# Patient Record
(demographics unavailable — no encounter records)

---

## 2025-03-11 NOTE — ASSESSMENT
[FreeTextEntry1] : Patient placed into wayne tape.  Gentle range of motion of the finger.  Icing, anti-inflammatory.  Follow-up in our hand department for further evaluation and treatment.

## 2025-03-11 NOTE — HISTORY OF PRESENT ILLNESS
[de-identified] : 46-year-old male comes in today for an evaluation of his left pinky finger pain and injury that occurred last night.  Patient had a fall sustained a dislocation.  Went to [facility] had an x-ray done reduction and was placed into an AlumaFoam splint.

## 2025-03-11 NOTE — IMAGING
[de-identified] : On examination of the left fifth digit swelling, skin is intact.  Nail is intact.  He has tenderness over the PIP joint middle phalanx and proximal phalanx.  No tenderness over the DIP joint or distal phalanx.  No tenderness over the fifth metacarpal.  He is able to flex extend at the DIP and PIP joint although with restriction and pain.    X-ray reviewed from [facility] Left fifth digit dislocation at the PIP joint with associated soft tissue swelling and volar osseous fragment.  Postreduction film with Interval closed reduction of left fifth digit PIP joint dislocation. X-ray in the office today left fifth digit 3 views no dislocation, maintained reduction, volar middle phalanx fracture

## 2025-03-19 NOTE — IMAGING
[de-identified] : On examination of the lumbar spine, there is no focal tenderness ovation over the lumbar vertebrae's, nontender the midline. No muscle spasm palpated. Patient has significant decreased range of motion to lumbar spine to flexion and extension, patient is only able to forward flex to about 15 degrees and extend to about 5 degrees with and pain. Patient has mild decreased motor strength to the left lower extremity when compared to the right. Positive straight leg raise to the left, negative to the right. Antalgic gait.  Radiographs of the lumbar spine, 4 views were taken, negative for any acute fracture or dislocation, some straightening of the normal lordosis of the lumbar spine.

## 2025-03-19 NOTE — HISTORY OF PRESENT ILLNESS
[de-identified] : 46-year-old male here for an evaluation of pain to the lower back, patient states that on March 17, 2025, he bent forward at and he had sharp severe pain to his lower back, he was unable to straighten his back to neutral.  Patient states that since then he is having significant pain when walking, sitting or standing.  Patient states that the pain at rest is 7/10, pain with activity 10/10.  Patient also gives a history of a similar condition in September 2024, patient states that at that time he was lifting his daughter who is about 50 pounds and he has severe pain to the lower back, he was seen in our office, he was given medication for his pain and therapy, patient has states that the pain is slightly improved but has never been pain-free.

## 2025-03-19 NOTE — DISCUSSION/SUMMARY
[de-identified] : Impression: Lumbar sprain, rule out HNP  Plan: Patient was advised for physical therapy, a prescription for Medrol Dosepak was sent to the pharmacy and muscle relaxant, patient was referred for an MRI of the lumbar spine to rule out any herniated disc or bulging disc.  Patient unable to come into work due to severe pain to the lower back, and note to work from home was given  Follow-up: 4 weeks for repeat evaluation with Dr. Bhatti`

## 2025-03-28 NOTE — HISTORY OF PRESENT ILLNESS
[de-identified] : 46-year-old male had a left little finger PIP dislocation.  He was a lateral dislocation.  He was reduced.  He comes in today for evaluation.  He really has not been trying to bend the finger forcibly.

## 2025-03-28 NOTE — PHYSICAL EXAM
[de-identified] : Patient has grossly good alignment of the finger.  There is tenderness to palpation.  Normal capillary refill.  Is no hyperextension instability.

## 2025-03-28 NOTE — DATA REVIEWED
[FreeTextEntry1] : Radiographs in the hospital reviewed showing a lateral dislocation and then a relocated finger

## 2025-03-28 NOTE — ASSESSMENT
[FreeTextEntry1] : Patient is dislocation of his left little finger PIP joint.  Buddy tape range of motion exercises discussed with the patient.  He is also given a prescription for therapy if he does not get his motion back quickly he should go to formal therapy understands this he will see me back on an as needed basis.

## 2025-04-16 NOTE — IMAGING
[de-identified] : TTP midline spine and paraspinal musculature  Strength                                          Hip flexor   Right: 5/5; Left: 5/5                              Knee extensor     Right: 5/5; Left: 5/5                      Ankle dorsiflexion   Right: 5/5; Left: 5/5                   EHL           Right: 5/5; Left: 5/5                                 Ankle plantarflexion       Right: 5/5; Left: 5/5  Sensation L1   Right: 2/2; Left: 2/2 L2   Right: 2/2; Left: 2/2 L3   Right: 2/2; Left: 2/2 L4   Right: 2/2; Left: 2/2 L5   Right: 2/2; Left: 2/2 S1   Right: 2/2; Left: 2/2  Reflexes Patella   Right: 2+; Left 2+ Achilles   Right: 2+; Left 2+ Clonus  Right: absent; L: absent

## 2025-04-16 NOTE — HISTORY OF PRESENT ILLNESS
[de-identified] : 46-year-old male presents with low back pain.  He hurt his back again in March.  It goes into his left thigh.  He has not done any physical therapy because of this his MRI of his lumbar spine was denied by the insurance company.  He did not start therapy he did get a prescription of

## 2025-04-16 NOTE — DISCUSSION/SUMMARY
[de-identified] : 46-year-old male with lumbar injury.  I urged him to do physical therapy he will follow-up in 6 weeks if his pain and he will get an order for l MRI umbar spine

## 2025-05-23 NOTE — PHYSICAL EXAM
[de-identified] : Patient has ability extend the finger to neutral.  Has some asymmetric swelling along the radial collateral ligament level.  He has 95% of his motion back.  No instability to stress testing of the collateral ligament

## 2025-05-23 NOTE — HISTORY OF PRESENT ILLNESS
[de-identified] : 47-year-old male had a left little finger PIP dislocation.  He comes in today for evaluation.  He got it significant improvement in his motion he still having some pain and discomfort and stiffness he was concerned and he came in the office for evaluation

## 2025-05-23 NOTE — ASSESSMENT
[FreeTextEntry1] : Patient had a PIP dislocation.  The plan is continue range of motion exercises and stretching exercises I have reassured him that he should get all his motion back.  He will see me back on an as-needed basis.  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  It is difficult for him to get to some cords because of the lack of flexion.  Some of that may be related to swelling.  Use of ice and continue with therapy exercises.

## 2025-06-06 NOTE — HISTORY OF PRESENT ILLNESS
[de-identified] : ORIGINAL PRESENATION:  46-year-old male presents with low back pain. He hurt his back again in March. It goes into his left thigh. He has not done any physical therapy because of this his MRI of his lumbar spine was denied by the insurance company. He did not start therapy he did get a prescription of.   TODAY:  I had the pleasure of seeing Mr. Zaman today in follow up.  His previous history and physical findings have been reviewed.  He is under our care for lumbar pain which she is receiving continue treatment for.  He has been attending physical therapy for the past 6 weeks stating that there has been slight improvement however he continues to experience pain down both his legs with associated numbness and tingling.  He has issues with standing and sitting saying has to constantly change positions due to the pain he is experiencing.  He has been using over-the-counter medication including ibuprofen without any improvement as well as tizanidine and therefore we will evaluate him further at this time.

## 2025-06-06 NOTE — IMAGING
[de-identified] :  Lumbar spine: 5 out of 5 strength, 2+ reflexes, positive tenderness palpation bilateral lumbar paraspinal muscles, mildly positive straight leg raise bilaterally 45 degrees, motion with flexion although there is stiffness and pain full, neurovascular intact.

## 2025-06-06 NOTE — REVIEW OF SYSTEMS
[Joint Pain] : joint pain [Joint Stiffness] : joint stiffness [Negative] : Constitutional [de-identified] : lower extremity parasthesias

## 2025-06-06 NOTE — ASSESSMENT
[FreeTextEntry1] : 47-year-old male with lumbar strain, lumbar radiculopathy.  Due to the fact that he is failed 6 weeks of conservative treatment including anti-inflammatory medication as well as physical therapy I am requesting authorization for an MRI of the lumbar spine for further assessment.  He will follow-up once testing is completed to review the results and discuss neck steps.  Patient is aware there is any issue and contact the office and he verbalized understanding and agreement.

## 2025-06-20 NOTE — DISCUSSION/SUMMARY
[de-identified] : 47Y male with lumbar radiculopathy secondary to L5-S1 left-sided disc herniation.  I am recommending that he follow-up with Dr. Sun to discuss epidural steroid injections if he fails injections he could be candidate for L5-S1 microdiscectomy discussed with the patient the goal that surgery is relief of leg pain.  Risk include approach, wound healing, CSF leak, incomplete pain relief, chronic pain, reherniation, the radicular process, organ damage, blood clots, death.  I will see him back in 2 months

## 2025-06-20 NOTE — DATA REVIEWED
[FreeTextEntry1] : I reviewed the MRI of the lumbar spine.  He has a left-sided L5-S1 disc herniation causing some mild to moderate lateral recess stenosis and foraminal stenosis

## 2025-06-20 NOTE — HISTORY OF PRESENT ILLNESS
[de-identified] : 47-year-old male presents today with continued low back pain radiating down his left leg.  He has been doing physical therapy and send helping him.  He has not had an injection he has an MRI of his lumbar spine.

## 2025-06-24 NOTE — DATA REVIEWED
[FreeTextEntry1] : MRI of the lumbar spine dated 5/10/25 demonstrates L5/S1 left paracentral/neuroforaminal disc protrusion.

## 2025-06-24 NOTE — ASSESSMENT
[FreeTextEntry1] : This is a 47-year-old male with complaints of lower back pain with radicular features into the left lower extremity in the setting of a left L5-S1 disc herniation.  Pain is associated with numbness and tingling.  He notes that this increases at nighttime.  Pain initially started in September when he bent down to  his daughter.  Physical therapy provided him with temporary relief.  Imaging studies as well as physical exam findings corroborate the symptomatology.  He defers injection therapy at this time and would like to remain conservative.  I will provide him with a prescription for it an at home TENS unit and refill the Naproxen.  Will also provide him a letter stating that he should work from home. He will follow up in 4 weeks for reassessment. All this patients questions were answered and the conversation was understood well.  Entered by Gris Stroud, acting as scribe for Dr. Sun.  Documentation recorded by the scribe, in my presence, accurately reflects the service I personally performed, and the decisions made by me with my edits as appropriate.     Thank you for allowing me to assist in the management of this patient.     Best Regards,     Aria Sun M.D., FAAPMR     Diplomate, American Board of Physical Medicine and Rehabilitation Diplomate, American Board of Pain Medicine

## 2025-06-24 NOTE — PHYSICAL EXAM
[Normal Coordination] : normal coordination [Normal DTR UE/LE] : normal DTR UE/LE  [Normal Sensation] : normal sensation [Normal Mood and Affect] : normal mood and affect [Oriented] : oriented [Able to Communicate] : able to communicate [Well Developed] : well developed [Well Nourished] : well nourished [] : negative sitting straight leg raise

## 2025-06-24 NOTE — HISTORY OF PRESENT ILLNESS
[FreeTextEntry1] : This is a 47-year-old male here to establish care for lumbar radiculopathy. The pain refers into the left lower extremity with associated numbness tingling. This sensation increases at night. The pain started in September when he bent down to  his daughter. The pain resolved on its own until March when he experienced another flare up. He tried 5-6 weeks of physical therapy with temporary relief. He is referred to me by Dr. Bhatti for a pain management consultation. The MRI of the lumbar spine was reviewed with the patient and is documented below. There is evidence of a L5/S1 left paracentral/neuroforaminal disc protrusion. The option to try injection therapy was discussed in detail. He defers at this time. Instead, he is interested in continuing with conservative treatment.